# Patient Record
Sex: MALE | Race: WHITE | NOT HISPANIC OR LATINO | URBAN - METROPOLITAN AREA
[De-identification: names, ages, dates, MRNs, and addresses within clinical notes are randomized per-mention and may not be internally consistent; named-entity substitution may affect disease eponyms.]

---

## 2017-02-12 ENCOUNTER — EMERGENCY (EMERGENCY)
Facility: HOSPITAL | Age: 22
LOS: 1 days | Discharge: HOME | End: 2017-02-12
Attending: EMERGENCY MEDICINE | Admitting: EMERGENCY MEDICINE
Payer: MEDICAID

## 2017-02-12 VITALS
RESPIRATION RATE: 17 BRPM | SYSTOLIC BLOOD PRESSURE: 112 MMHG | TEMPERATURE: 98 F | OXYGEN SATURATION: 100 % | HEART RATE: 81 BPM | DIASTOLIC BLOOD PRESSURE: 71 MMHG

## 2017-02-12 DIAGNOSIS — R51 HEADACHE: ICD-10-CM

## 2017-02-12 DIAGNOSIS — S02.609A FRACTURE OF MANDIBLE, UNSPECIFIED, INITIAL ENCOUNTER FOR CLOSED FRACTURE: Chronic | ICD-10-CM

## 2017-02-12 PROCEDURE — 99284 EMERGENCY DEPT VISIT MOD MDM: CPT | Mod: 25

## 2017-02-12 PROCEDURE — 21310: CPT

## 2017-02-12 PROCEDURE — 70486 CT MAXILLOFACIAL W/O DYE: CPT | Mod: 26

## 2017-02-12 PROCEDURE — 70486 CT MAXILLOFACIAL W/O DYE: CPT

## 2017-02-12 RX ORDER — IBUPROFEN 200 MG
600 TABLET ORAL ONCE
Qty: 0 | Refills: 0 | Status: COMPLETED | OUTPATIENT
Start: 2017-02-12 | End: 2017-02-12

## 2017-02-12 RX ADMIN — Medication 1 TABLET(S): at 17:18

## 2017-02-12 RX ADMIN — Medication 600 MILLIGRAM(S): at 16:07
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
## 2017-02-12 NOTE — ED PROVIDER NOTE - ENMT, MLM
nose swollen no septal hematoma, eechymoses along right nasal bridge and right inferior orbit. nose swollen no septal hematoma, eechymoses along right nasal bridge and right inferior orbit, tender at inferior orbit, healing abrasion over nasal bridge

## 2017-02-12 NOTE — ED PROVIDER NOTE - CARE PLAN
Principal Discharge DX:	Concussion Principal Discharge DX:	Nasal fracture  Secondary Diagnosis:	Orbital fracture
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
## 2017-02-14 ENCOUNTER — EMERGENCY (EMERGENCY)
Facility: HOSPITAL | Age: 22
LOS: 1 days | Discharge: LEFT BEFORE DISCHARGE | End: 2017-02-14
Attending: EMERGENCY MEDICINE | Admitting: EMERGENCY MEDICINE
Payer: MEDICAID

## 2017-02-14 VITALS
SYSTOLIC BLOOD PRESSURE: 124 MMHG | HEART RATE: 78 BPM | RESPIRATION RATE: 16 BRPM | DIASTOLIC BLOOD PRESSURE: 74 MMHG | TEMPERATURE: 98 F | OXYGEN SATURATION: 100 %

## 2017-02-14 DIAGNOSIS — S02.609A FRACTURE OF MANDIBLE, UNSPECIFIED, INITIAL ENCOUNTER FOR CLOSED FRACTURE: Chronic | ICD-10-CM

## 2017-02-14 PROCEDURE — 99283 EMERGENCY DEPT VISIT LOW MDM: CPT

## 2017-02-14 NOTE — ED PROVIDER NOTE - PHYSICAL EXAMINATION
rt inf orbital/nasal tender, ecchymoses, no crepitus, eomi perrla, limited jaw opening secondary to pain, no malocclusion, no tmj tender, no loose teeth.   ncat  neck supple from strength 5/5  nml gait
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
## 2017-02-14 NOTE — ED PROVIDER NOTE - ATTENDING CONTRIBUTION TO CARE
DR. HATHAWAY, UPFRONT ATTENDING MD-  I was the attending upfront in Triage today and I performed the initial face to face bedside interview with patient regarding history of present illness, review of symptoms and past medical history. I completed an independent physical exam.  Since I was the inital provider who evalauted this patient, the history, ROS and examination was documented by me in the note.  I have signed out the follow up of any pending tests (ie. labs and/or radiological studies) to the PA.  I have discussed patient's plan of care and disposition with PA.  The scribe's documentation has been prepared under my direction and personally reviewed by me in its entirety. I confirm that the note above accurately reflects all work, treatment, procedures, and medical decision making performed by Rico Hathaway MD DR. LOPEZ, UPFRONT ATTENDING MD-  I was the attending upfront in Triage today and I performed the initial face to face bedside interview with patient regarding history of present illness, review of symptoms and past medical history. I completed an independent physical exam.  Since I was the inital provider who evalauted this patient, the history, ROS and examination was documented by me in the note.  I have signed out the follow up of any pending tests (ie. labs and/or radiological studies) to the PA.  I have discussed patient's plan of care and disposition with PA.

## 2017-03-08 ENCOUNTER — EMERGENCY (EMERGENCY)
Facility: HOSPITAL | Age: 22
LOS: 1 days | Discharge: HOME | End: 2017-03-08
Attending: EMERGENCY MEDICINE | Admitting: EMERGENCY MEDICINE
Payer: MEDICAID

## 2017-03-08 VITALS
DIASTOLIC BLOOD PRESSURE: 74 MMHG | SYSTOLIC BLOOD PRESSURE: 104 MMHG | HEART RATE: 88 BPM | RESPIRATION RATE: 16 BRPM | OXYGEN SATURATION: 98 %

## 2017-03-08 VITALS
RESPIRATION RATE: 18 BRPM | DIASTOLIC BLOOD PRESSURE: 92 MMHG | SYSTOLIC BLOOD PRESSURE: 146 MMHG | TEMPERATURE: 98 F | OXYGEN SATURATION: 96 % | HEART RATE: 86 BPM

## 2017-03-08 DIAGNOSIS — S02.609A FRACTURE OF MANDIBLE, UNSPECIFIED, INITIAL ENCOUNTER FOR CLOSED FRACTURE: Chronic | ICD-10-CM

## 2017-03-08 PROCEDURE — 99283 EMERGENCY DEPT VISIT LOW MDM: CPT | Mod: 25

## 2017-03-08 PROCEDURE — 71020: CPT | Mod: 26

## 2017-03-08 PROCEDURE — 99284 EMERGENCY DEPT VISIT MOD MDM: CPT | Mod: 25

## 2017-03-08 PROCEDURE — 71046 X-RAY EXAM CHEST 2 VIEWS: CPT

## 2017-03-08 RX ORDER — IBUPROFEN 200 MG
600 TABLET ORAL ONCE
Qty: 0 | Refills: 0 | Status: COMPLETED | OUTPATIENT
Start: 2017-03-08 | End: 2017-03-08

## 2017-03-08 RX ORDER — IBUPROFEN 200 MG
20 TABLET ORAL
Qty: 400 | Refills: 0 | OUTPATIENT
Start: 2017-03-08 | End: 2017-03-13

## 2017-03-08 RX ADMIN — Medication 600 MILLIGRAM(S): at 02:20

## 2017-03-08 NOTE — ED PROVIDER NOTE - NS ED ROS FT
Constitutional: No fever or chills  Eyes: No visual changes  HEENT: No throat pain  CV: No chest pain  Resp: No SOB no cough  GI: No abd pain, nausea or vomiting  : No dysuria  MSK: right rib pain  Skin: No rash  Neuro: No headache

## 2017-03-08 NOTE — ED PROVIDER NOTE - CARE PLAN
Principal Discharge DX:	Musculoskeletal pain  Instructions for follow-up, activity and diet:	1. return for worsening symptoms or anything concerning to you  2. take all home meds as prescribed  3. follow up with your pmd call to make an appointment  4. Take motrin 600mg PO Q6 hours prn pain

## 2017-03-08 NOTE — ED PROVIDER NOTE - PHYSICAL EXAMINATION
Constitutional: mild distress  Eyes: PERRLA EOMI  Head: Normocephalic atraumatic  Cardiac: regular rate   Resp: Lungs CTAB  GI: Abd s/nt/nd  Neuro: CN2-12 intact  Skin: No rashes  MSK: ttp of right 7th rib mid axillary line.

## 2017-03-08 NOTE — ED PROVIDER NOTE - MEDICAL DECISION MAKING DETAILS
21M presents with right rib pain after fall. clear lungs normal vital signs pain over 1 rib. Will obtain xray pain control and reassess
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
## 2017-03-08 NOTE — ED PROVIDER NOTE - PLAN OF CARE
1. return for worsening symptoms or anything concerning to you  2. take all home meds as prescribed  3. follow up with your pmd call to make an appointment  4. Take motrin 600mg PO Q6 hours prn pain

## 2017-03-09 ENCOUNTER — EMERGENCY (EMERGENCY)
Facility: HOSPITAL | Age: 22
LOS: 1 days | Discharge: HOME | End: 2017-03-09
Attending: EMERGENCY MEDICINE | Admitting: EMERGENCY MEDICINE
Payer: MEDICAID

## 2017-03-09 VITALS
DIASTOLIC BLOOD PRESSURE: 70 MMHG | TEMPERATURE: 98 F | RESPIRATION RATE: 16 BRPM | SYSTOLIC BLOOD PRESSURE: 117 MMHG | OXYGEN SATURATION: 98 % | HEART RATE: 82 BPM

## 2017-03-09 DIAGNOSIS — S02.609A FRACTURE OF MANDIBLE, UNSPECIFIED, INITIAL ENCOUNTER FOR CLOSED FRACTURE: Chronic | ICD-10-CM

## 2017-03-09 DIAGNOSIS — R07.81 PLEURODYNIA: ICD-10-CM

## 2017-03-09 PROCEDURE — 99282 EMERGENCY DEPT VISIT SF MDM: CPT | Mod: 25

## 2017-03-09 PROCEDURE — 99282 EMERGENCY DEPT VISIT SF MDM: CPT

## 2017-03-09 NOTE — ED PROVIDER NOTE - MEDICAL DECISION MAKING DETAILS
22yo male with pmh of depression and anxiety presents with right rib pain - declining pain meds - reassurance

## 2017-03-09 NOTE — ED PROVIDER NOTE - ATTENDING CONTRIBUTION TO CARE
right sided pain with movement, requesting not to remove clothing for exam, limited exam, no crepitus, no bony deformity on palpation, has capacity to make decision, he is requesting me not to contact family Oscar Larson MD (attending)

## 2017-03-09 NOTE — ED ADULT TRIAGE NOTE - CHIEF COMPLAINT QUOTE
right sided "rib pain" was seen here yesterday had cxr " I want a ct scan because an xray wont show anything" difficulty taking deep breath no active sob/dyspnea

## 2017-03-11 DIAGNOSIS — R07.81 PLEURODYNIA: ICD-10-CM
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
## 2017-03-11 DIAGNOSIS — F17.200 NICOTINE DEPENDENCE, UNSPECIFIED, UNCOMPLICATED: ICD-10-CM
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
## 2017-06-16 ENCOUNTER — EMERGENCY (EMERGENCY)
Facility: HOSPITAL | Age: 22
LOS: 1 days | Discharge: HOME | End: 2017-06-16
Admitting: EMERGENCY MEDICINE
Payer: COMMERCIAL

## 2017-06-16 VITALS
TEMPERATURE: 99 F | OXYGEN SATURATION: 100 % | DIASTOLIC BLOOD PRESSURE: 69 MMHG | SYSTOLIC BLOOD PRESSURE: 125 MMHG | RESPIRATION RATE: 18 BRPM | HEART RATE: 82 BPM

## 2017-06-16 DIAGNOSIS — F43.23 ADJUSTMENT DISORDER WITH MIXED ANXIETY AND DEPRESSED MOOD: ICD-10-CM

## 2017-06-16 DIAGNOSIS — S02.609A FRACTURE OF MANDIBLE, UNSPECIFIED, INITIAL ENCOUNTER FOR CLOSED FRACTURE: Chronic | ICD-10-CM

## 2017-06-16 LAB
ALBUMIN SERPL ELPH-MCNC: 4.5 G/DL — SIGNIFICANT CHANGE UP (ref 3.3–5)
ALP SERPL-CCNC: 71 U/L — SIGNIFICANT CHANGE UP (ref 40–120)
ALT FLD-CCNC: 18 U/L — SIGNIFICANT CHANGE UP (ref 4–41)
APAP SERPL-MCNC: < 15 UG/ML — LOW (ref 15–25)
AST SERPL-CCNC: 20 U/L — SIGNIFICANT CHANGE UP (ref 4–40)
BARBITURATES MEASUREMENT: NEGATIVE — SIGNIFICANT CHANGE UP
BASOPHILS # BLD AUTO: 0.04 K/UL — SIGNIFICANT CHANGE UP (ref 0–0.2)
BASOPHILS NFR BLD AUTO: 0.6 % — SIGNIFICANT CHANGE UP (ref 0–2)
BENZODIAZ SERPL-MCNC: NEGATIVE — SIGNIFICANT CHANGE UP
BILIRUB SERPL-MCNC: 0.4 MG/DL — SIGNIFICANT CHANGE UP (ref 0.2–1.2)
BUN SERPL-MCNC: 13 MG/DL — SIGNIFICANT CHANGE UP (ref 7–23)
CALCIUM SERPL-MCNC: 9.2 MG/DL — SIGNIFICANT CHANGE UP (ref 8.4–10.5)
CHLORIDE SERPL-SCNC: 104 MMOL/L — SIGNIFICANT CHANGE UP (ref 98–107)
CO2 SERPL-SCNC: 25 MMOL/L — SIGNIFICANT CHANGE UP (ref 22–31)
CREAT SERPL-MCNC: 0.67 MG/DL — SIGNIFICANT CHANGE UP (ref 0.5–1.3)
EOSINOPHIL # BLD AUTO: 0.16 K/UL — SIGNIFICANT CHANGE UP (ref 0–0.5)
EOSINOPHIL NFR BLD AUTO: 2.3 % — SIGNIFICANT CHANGE UP (ref 0–6)
ETHANOL BLD-MCNC: < 10 MG/DL — SIGNIFICANT CHANGE UP
GLUCOSE SERPL-MCNC: 89 MG/DL — SIGNIFICANT CHANGE UP (ref 70–99)
HCT VFR BLD CALC: 44.2 % — SIGNIFICANT CHANGE UP (ref 39–50)
HGB BLD-MCNC: 14.9 G/DL — SIGNIFICANT CHANGE UP (ref 13–17)
IMM GRANULOCYTES NFR BLD AUTO: 0.3 % — SIGNIFICANT CHANGE UP (ref 0–1.5)
LYMPHOCYTES # BLD AUTO: 2.59 K/UL — SIGNIFICANT CHANGE UP (ref 1–3.3)
LYMPHOCYTES # BLD AUTO: 36.9 % — SIGNIFICANT CHANGE UP (ref 13–44)
MCHC RBC-ENTMCNC: 31 PG — SIGNIFICANT CHANGE UP (ref 27–34)
MCHC RBC-ENTMCNC: 33.7 % — SIGNIFICANT CHANGE UP (ref 32–36)
MCV RBC AUTO: 92.1 FL — SIGNIFICANT CHANGE UP (ref 80–100)
MONOCYTES # BLD AUTO: 0.59 K/UL — SIGNIFICANT CHANGE UP (ref 0–0.9)
MONOCYTES NFR BLD AUTO: 8.4 % — SIGNIFICANT CHANGE UP (ref 2–14)
NEUTROPHILS # BLD AUTO: 3.61 K/UL — SIGNIFICANT CHANGE UP (ref 1.8–7.4)
NEUTROPHILS NFR BLD AUTO: 51.5 % — SIGNIFICANT CHANGE UP (ref 43–77)
PLATELET # BLD AUTO: 221 K/UL — SIGNIFICANT CHANGE UP (ref 150–400)
PMV BLD: 11.7 FL — SIGNIFICANT CHANGE UP (ref 7–13)
POTASSIUM SERPL-MCNC: 4.4 MMOL/L — SIGNIFICANT CHANGE UP (ref 3.5–5.3)
POTASSIUM SERPL-SCNC: 4.4 MMOL/L — SIGNIFICANT CHANGE UP (ref 3.5–5.3)
PROT SERPL-MCNC: 7.3 G/DL — SIGNIFICANT CHANGE UP (ref 6–8.3)
RBC # BLD: 4.8 M/UL — SIGNIFICANT CHANGE UP (ref 4.2–5.8)
RBC # FLD: 12.5 % — SIGNIFICANT CHANGE UP (ref 10.3–14.5)
SALICYLATES SERPL-MCNC: < 5 MG/DL — LOW (ref 15–30)
SODIUM SERPL-SCNC: 142 MMOL/L — SIGNIFICANT CHANGE UP (ref 135–145)
TSH SERPL-MCNC: 2.84 UIU/ML — SIGNIFICANT CHANGE UP (ref 0.27–4.2)
WBC # BLD: 7.01 K/UL — SIGNIFICANT CHANGE UP (ref 3.8–10.5)
WBC # FLD AUTO: 7.01 K/UL — SIGNIFICANT CHANGE UP (ref 3.8–10.5)

## 2017-06-16 PROCEDURE — 99285 EMERGENCY DEPT VISIT HI MDM: CPT | Mod: 25

## 2017-06-16 PROCEDURE — 90792 PSYCH DIAG EVAL W/MED SRVCS: CPT

## 2017-06-16 PROCEDURE — 93010 ELECTROCARDIOGRAM REPORT: CPT

## 2017-06-16 NOTE — ED ADULT TRIAGE NOTE - CHIEF COMPLAINT QUOTE
Patient arrives by EMS from [facility] c/o worsening anxiety with S/I and plan to jump off a rooftop, pt calm and cooperative with triage

## 2017-06-16 NOTE — ED BEHAVIORAL HEALTH ASSESSMENT NOTE - AXIS IV
Problems with interaction with legal system Educational problems/Occupational problems/Problems with interaction with legal system

## 2017-06-16 NOTE — ED BEHAVIORAL HEALTH ASSESSMENT NOTE - DIFFERENTIAL
deferred adjustment disorder; antisocial personality disorder; substance-induced mood disorder adjustment disorder; antisocial personality disorder; substance-induced mood disorder; major depression
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
## 2017-06-16 NOTE — ED BEHAVIORAL HEALTH ASSESSMENT NOTE - RISK ASSESSMENT
Risk factors include history of impulsivity, impaired insight & judgement, current suicidal ideation with plan and intent. This patient is at high risk for harm and requires inpatient level of care for safety and stabilization. Risk factors include history of impulsivity, limited insight, recent suicidal ideation, legal problems. This patient is at chronically elevated risk for harm, but at this time is denying suicidal or homicidal thoughts, wants to be discharged home so that he can watch TV, play video games, do his regular Friday evening prayers, does not wish to take psychiatric medication because he wants to live a long life and is concerned about damage to kidneys and liver.

## 2017-06-16 NOTE — ED BEHAVIORAL HEALTH ASSESSMENT NOTE - SUICIDE PROTECTIVE FACTORS
Supportive social network or family/Responsibility to family and others Responsibility to family and others/Supportive social network or family/Other

## 2017-06-16 NOTE — ED BEHAVIORAL HEALTH ASSESSMENT NOTE - PRIMARY DX
Major depressive disorder, recurrent severe without psychotic features Personality disorder Adjustment disorder with mixed anxiety and depressed mood

## 2017-06-16 NOTE — ED BEHAVIORAL HEALTH NOTE - BEHAVIORAL HEALTH NOTE
Writer was asked to assist the patient to get home.  was asked to assist the patient to get home by the evaluating psychiatrist. He requested to go by bus, but was not sure of the route or how many [employer] cards were needed.  printed a [employer] card trip planner and went over it with him, showing him that one [employer] card is needed, and that the trains are still running at this hour, which were his concerns.  provided him with [employer] card # [id number].
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
## 2017-06-16 NOTE — ED BEHAVIORAL HEALTH ASSESSMENT NOTE - DETAILS
see hpi report given to attending Dr Cox notified above wants to either jump off a building, or hang himself from a tree branch I spoke with Gabriel Gaines (therapist) at [facility], he will inform Dr. Astorga, and I will email them both as well. earlier today, stated that he wanted to either jump off a building, or hang himself from a tree branch

## 2017-06-16 NOTE — ED PROVIDER NOTE - OBJECTIVE STATEMENT
22 y/o M  hx Anxiety Disorder BIBA w c/o increase anxiety, depression and suicidal ideation to hang self secondary to pending court case. States " I don't want to see people, I don't want to talk" .  Denies punching or kicking any objects.  Denies pain, SOB, fever, chills, chest/abdominal discomfort. Denies HI/AH/VH. Denies recent use of alcohol or illicit drugs.

## 2017-06-16 NOTE — ED BEHAVIORAL HEALTH ASSESSMENT NOTE - PATIENT SEEN BY
Attending Psychiatrist supervising NP/Trainee and meeting pt Attending Psychiatrist without NP/Trainee

## 2017-06-16 NOTE — ED BEHAVIORAL HEALTH ASSESSMENT NOTE - OTHER PAST PSYCHIATRIC HISTORY (INCLUDE DETAILS REGARDING ONSET, COURSE OF ILLNESS, INPATIENT/OUTPATIENT TREATMENT)
10/16 [facility] 10/16 [facility] [place] - admitted to suicidal thoughts in context of legal issues, did not attend group therapy, was discharged on zoloft 125mg daily, atarax 50mg q6h prn. 10/16 admitted to [facility] - had suicidal thoughts in context of legal issues, did not attend group therapy, was discharged on zoloft 125mg daily, atarax 50mg q6h prn.

## 2017-06-16 NOTE — ED PROVIDER NOTE - MEDICAL DECISION MAKING DETAILS
22 y/o M  hx Anxiety Disorder   No evidence of physical injuries, broken skin or deformities.   Medical evaluation performed. There is no clinical evidence of intoxication or any acute medical problem requiring immediate intervention. Patient is awaiting psychiatric consultation. Final disposition will be determined by psychiatrist. 20 y/o M  hx Anxiety Disorder   No evidence of physical injuries, broken skin or deformities. Labs, Urine Tox/UA, EKG   Medical evaluation performed. There is no clinical evidence of intoxication or any acute medical problem requiring immediate intervention. Patient is awaiting psychiatric consultation. Final disposition will be determined by psychiatrist. 22 y/o M  hx Anxiety Disorder .  No evidence of physical injuries, broken skin or deformities. Labs, Urine Tox/UA, EKG   Medical evaluation performed. There is no clinical evidence of intoxication or any acute medical problem requiring immediate intervention. Patient is awaiting psychiatric consultation. Final disposition will be determined by psychiatrist.

## 2017-06-16 NOTE — ED BEHAVIORAL HEALTH ASSESSMENT NOTE - SUMMARY
Patient is a 21 year old single male; domiciled with parents and sister; non caregiver; full time student at [employer]; history of Anxiety Disorder, hx of Cannabis Abuse in past, no prior psychiatric admissions, no suicide attempts, sees Dr. Astorga in the outpatient [facility] clinic, no violence hx, currently on probation, pending potential senior care time after a probation violation, no ETOH use/DTs, no current substance use per patient, PMH denied, BIB EMS after psychiatrist called as patient was depressed and suicidal with a plan to shoot himself with a gun or to jump off of a building.     In ED, patient is agitated, actively suicidal, not able to state he will not kill himself on the inpatient unit, and in poor emotional control despite receiving medication. This patient is at high risk for harm and will require inpatient psychiatric admission & will require a 1:1 for active SI. Patient is a 21 year old single male; domiciled with parents and sister; non caregiver; full time student at [employer]; history of Anxiety Disorder, hx of Cannabis Abuse in past, no prior psychiatric admissions, no suicide attempts, sees Dr. Astorga in the outpatient [facility] clinic, no violence hx, currently on probation, pending potential assisted time after a probation violation, no ETOH use/DTs, no current substance use per patient, PMH denied, BIB EMS after psychiatrist called as patient was depressed and suicidal with a plan to shoot himself with a gun or to jump off of a building.     In ED, initially stated that he was hopeless and wanted to die, then later stated that he wanted to go home, play video games, watch TV, and planned on doing his regular Friday prayers this evening.  Stated that he was not interested in taking psychiatric medications because he wants to live a long life and is concerned about long-term effects on his kidney and liver. Patient is a 21 year old single male, domiciled with parents and sister; recent student at [employer], history of Anxiety Disorder, hx of Cannabis Abuse in past, 1 psychiatric admission in 2016 at [facility], sees Dr. Astorga in the outpatient [facility], no violence hx, currently on probation, pending potential [facility] time after a probation violation, court hearing next Wednesday, no ETOH use/DTs, no current substance use per patient, PMH of mandibular fracture (jaw wired shut last year), BIB EMS after psychiatrist called as patient was depressed and suicidal with a plan to jump off of building.     Collateral sources of info were contacted (father, mother, therapist at [facility]) - see above.      In ED, initially stated that he was hopeless and wanted to die, then later stated that he wanted to go home, play video games, watch TV, and planned on doing his regular Friday prayers this evening.  Stated that he was not interested in taking psychiatric medications because he wants to live a long life and is concerned about long-term effects on his kidney and liver.  Adamently denies suicidal thoughts, states he is hopeful, wants to work with his doctor and therapist to "get better" and "find out what's wrong with me" so "I can live a long life".

## 2017-06-16 NOTE — ED BEHAVIORAL HEALTH ASSESSMENT NOTE - CASE SUMMARY
20 yo M hx as above presents with active suicidal ideation to kill self, plan to jump off building or hang self in the context of legal issues related to sexual offenses.  He is extremely anxious, depressed, and hopeless.  Requires acute inpatient psychiatric hospitalization for safety and stabilization.

## 2017-06-16 NOTE — ED BEHAVIORAL HEALTH ASSESSMENT NOTE - BEHAVIOR
[facility] Hospitalist Progress Note     PCP: Annie Rose MD    SUBJECTIVE:  Pt had nausea, which improved since earlier today after fentanyl was dc'd and she received nausea med. Still sob but at baseline. No chest pain.     OBJECTIVE:  Temp:  [97 °F (36 docusate sodium  100 mg Oral BID     • bupivacaine 0.1% in  ml epidural     • sodium chloride 75 mL/hr at 03/08/17 2000   • lactated ringers     • Nitroglycerin in D5W Stopped (03/09/17 0300)     ondansetron HCl, Naloxone HCl, DiphenhydrAMINE HCl, Na Cooperative

## 2018-04-16 ENCOUNTER — APPOINTMENT (OUTPATIENT)
Dept: DERMATOLOGY | Facility: CLINIC | Age: 23
End: 2018-04-16
Payer: COMMERCIAL

## 2018-04-16 VITALS
BODY MASS INDEX: 30.06 KG/M2 | HEIGHT: 70 IN | DIASTOLIC BLOOD PRESSURE: 62 MMHG | SYSTOLIC BLOOD PRESSURE: 112 MMHG | WEIGHT: 210 LBS

## 2018-04-16 PROBLEM — Z00.00 ENCOUNTER FOR PREVENTIVE HEALTH EXAMINATION: Noted: 2018-04-16

## 2018-04-16 PROCEDURE — 99202 OFFICE O/P NEW SF 15 MIN: CPT

## 2018-04-16 RX ORDER — SERTRALINE 25 MG/1
0 TABLET, FILM COATED ORAL
Qty: 0 | Refills: 0 | COMMUNITY

## 2018-04-16 RX ORDER — BENZOYL PEROXIDE 5 G/100G
5 LIQUID TOPICAL
Qty: 1 | Refills: 5 | Status: ACTIVE | COMMUNITY
Start: 2018-04-16 | End: 1900-01-01

## 2018-04-16 RX ORDER — TRETINOIN 0.25 MG/G
0.03 CREAM TOPICAL
Qty: 1 | Refills: 3 | Status: ACTIVE | COMMUNITY
Start: 2018-04-16 | End: 1900-01-01

## 2018-04-18 ENCOUNTER — OTHER (OUTPATIENT)
Age: 23
End: 2018-04-18

## 2018-04-20 ENCOUNTER — OUTPATIENT (OUTPATIENT)
Dept: OUTPATIENT SERVICES | Facility: HOSPITAL | Age: 23
LOS: 1 days | End: 2018-04-20
Payer: COMMERCIAL

## 2018-04-20 ENCOUNTER — APPOINTMENT (OUTPATIENT)
Dept: INTERNAL MEDICINE | Facility: CLINIC | Age: 23
End: 2018-04-20
Payer: COMMERCIAL

## 2018-04-20 ENCOUNTER — OUTPATIENT (OUTPATIENT)
Dept: OUTPATIENT SERVICES | Facility: HOSPITAL | Age: 23
LOS: 1 days | Discharge: HOME | End: 2018-04-20

## 2018-04-20 VITALS
DIASTOLIC BLOOD PRESSURE: 80 MMHG | WEIGHT: 208 LBS | BODY MASS INDEX: 29.78 KG/M2 | HEIGHT: 70 IN | SYSTOLIC BLOOD PRESSURE: 120 MMHG

## 2018-04-20 DIAGNOSIS — Z78.9 OTHER SPECIFIED HEALTH STATUS: ICD-10-CM

## 2018-04-20 DIAGNOSIS — L70.0 ACNE VULGARIS: ICD-10-CM

## 2018-04-20 DIAGNOSIS — F17.200 NICOTINE DEPENDENCE, UNSPECIFIED, UNCOMPLICATED: ICD-10-CM

## 2018-04-20 DIAGNOSIS — S02.609A FRACTURE OF MANDIBLE, UNSPECIFIED, INITIAL ENCOUNTER FOR CLOSED FRACTURE: Chronic | ICD-10-CM

## 2018-04-20 DIAGNOSIS — I10 ESSENTIAL (PRIMARY) HYPERTENSION: ICD-10-CM

## 2018-04-20 DIAGNOSIS — F31.9 BIPOLAR DISORDER, UNSPECIFIED: ICD-10-CM

## 2018-04-20 DIAGNOSIS — Z00.00 ENCOUNTER FOR GENERAL ADULT MEDICAL EXAMINATION W/OUT ABNORMAL FINDINGS: ICD-10-CM

## 2018-04-20 DIAGNOSIS — Z82.0 FAMILY HISTORY OF EPILEPSY AND OTHER DISEASES OF THE NERVOUS SYSTEM: ICD-10-CM

## 2018-04-20 PROCEDURE — G0463: CPT

## 2018-04-20 PROCEDURE — 99204 OFFICE O/P NEW MOD 45 MIN: CPT | Mod: GC

## 2018-04-23 DIAGNOSIS — F19.90 OTHER PSYCHOACTIVE SUBSTANCE USE, UNSPECIFIED, UNCOMPLICATED: ICD-10-CM

## 2018-04-23 DIAGNOSIS — F31.9 BIPOLAR DISORDER, UNSPECIFIED: ICD-10-CM

## 2018-04-25 DIAGNOSIS — L70.0 ACNE VULGARIS: ICD-10-CM

## 2018-04-25 DIAGNOSIS — F31.9 BIPOLAR DISORDER, UNSPECIFIED: ICD-10-CM

## 2018-04-25 DIAGNOSIS — Z78.9 OTHER SPECIFIED HEALTH STATUS: ICD-10-CM

## 2018-04-25 DIAGNOSIS — Z82.0 FAMILY HISTORY OF EPILEPSY AND OTHER DISEASES OF THE NERVOUS SYSTEM: ICD-10-CM

## 2018-04-25 DIAGNOSIS — F17.200 NICOTINE DEPENDENCE, UNSPECIFIED, UNCOMPLICATED: ICD-10-CM

## 2018-05-24 LAB
ALBUMIN SERPL ELPH-MCNC: 4.7 G/DL
ALP BLD-CCNC: 91 U/L
ALT SERPL-CCNC: 27 U/L
ANION GAP SERPL CALC-SCNC: 12 MMOL/L
AST SERPL-CCNC: 18 U/L
BASOPHILS # BLD AUTO: 0.03 K/UL
BASOPHILS NFR BLD AUTO: 0.4 %
BILIRUB SERPL-MCNC: 0.6 MG/DL
BUN SERPL-MCNC: 11 MG/DL
CALCIUM SERPL-MCNC: 9.6 MG/DL
CHLORIDE SERPL-SCNC: 104 MMOL/L
CHOLEST SERPL-MCNC: 203 MG/DL
CHOLEST/HDLC SERPL: 3.9 RATIO
CO2 SERPL-SCNC: 26 MMOL/L
CREAT SERPL-MCNC: 0.87 MG/DL
EOSINOPHIL # BLD AUTO: 0.16 K/UL
EOSINOPHIL NFR BLD AUTO: 1.9 %
GLUCOSE SERPL-MCNC: 86 MG/DL
HBA1C MFR BLD HPLC: 5.5 %
HCT VFR BLD CALC: 46.6 %
HDLC SERPL-MCNC: 52 MG/DL
HGB BLD-MCNC: 15.9 G/DL
HIV1+2 AB SPEC QL IA.RAPID: NONREACTIVE
IMM GRANULOCYTES NFR BLD AUTO: 0.1 %
LDLC SERPL CALC-MCNC: 136 MG/DL
LYMPHOCYTES # BLD AUTO: 1.85 K/UL
LYMPHOCYTES NFR BLD AUTO: 22.1 %
MAN DIFF?: NORMAL
MCHC RBC-ENTMCNC: 30.3 PG
MCHC RBC-ENTMCNC: 34.1 GM/DL
MCV RBC AUTO: 88.8 FL
MONOCYTES # BLD AUTO: 0.6 K/UL
MONOCYTES NFR BLD AUTO: 7.2 %
NEUTROPHILS # BLD AUTO: 5.74 K/UL
NEUTROPHILS NFR BLD AUTO: 68.3 %
PLATELET # BLD AUTO: 267 K/UL
POTASSIUM SERPL-SCNC: 4.6 MMOL/L
PROT SERPL-MCNC: 8 G/DL
RBC # BLD: 5.25 M/UL
RBC # FLD: 13.4 %
SODIUM SERPL-SCNC: 142 MMOL/L
TRIGL SERPL-MCNC: 77 MG/DL
TSH SERPL-ACNC: 1.44 UIU/ML
WBC # FLD AUTO: 8.39 K/UL

## 2018-08-08 NOTE — ED PROVIDER NOTE - OBJECTIVE STATEMENT
22yo male with pmh of depression and anxiety presents with right rib pain. Pt fell down stairs 5 days ago. Since then has been having progressive pain in the right ribs. hasn't taken anything for it. Has been acting normally. No vomiting no nausea. no headache. no change in strength or sensation. pain moderate constant non-radiating. Pt was here yesterday for same today wants to make sure nothing is broken. Pt denies difficulty breathing. Denies SI/HI/AH/VH pt

## 2018-11-28 ENCOUNTER — RX RENEWAL (OUTPATIENT)
Age: 23
End: 2018-11-28

## 2018-11-28 RX ORDER — TRETINOIN 0.25 MG/G
0.03 CREAM TOPICAL
Qty: 45 | Refills: 3 | Status: ACTIVE | COMMUNITY
Start: 2018-07-06 | End: 1900-01-01

## 2019-03-06 ENCOUNTER — OUTPATIENT (OUTPATIENT)
Dept: OUTPATIENT SERVICES | Facility: HOSPITAL | Age: 24
LOS: 1 days | Discharge: STILL ADMITTED OR ADMINISTRATIVE | End: 2019-03-06
Payer: MEDICAID

## 2019-03-06 DIAGNOSIS — S02.609A FRACTURE OF MANDIBLE, UNSPECIFIED, INITIAL ENCOUNTER FOR CLOSED FRACTURE: Chronic | ICD-10-CM

## 2019-03-07 DIAGNOSIS — F12.20 CANNABIS DEPENDENCE, UNCOMPLICATED: ICD-10-CM

## 2019-03-07 DIAGNOSIS — F39 UNSPECIFIED MOOD [AFFECTIVE] DISORDER: ICD-10-CM

## 2019-03-07 DIAGNOSIS — F19.94 OTHER PSYCHOACTIVE SUBSTANCE USE, UNSPECIFIED WITH PSYCHOACTIVE SUBSTANCE-INDUCED MOOD DISORDER: ICD-10-CM

## 2019-03-07 PROBLEM — F41.9 ANXIETY DISORDER, UNSPECIFIED: Chronic | Status: ACTIVE | Noted: 2017-02-12

## 2019-03-07 PROBLEM — F32.9 MAJOR DEPRESSIVE DISORDER, SINGLE EPISODE, UNSPECIFIED: Chronic | Status: ACTIVE | Noted: 2017-02-12

## 2019-03-07 PROCEDURE — 90792 PSYCH DIAG EVAL W/MED SRVCS: CPT

## 2019-10-01 NOTE — ED ADULT TRIAGE NOTE - ESI TRIAGE ACUITY LEVEL, MLM
4 Itraconazole Pregnancy And Lactation Text: This medication is Pregnancy Category C and it isn't know if it is safe during pregnancy. It is also excreted in breast milk.

## 2021-06-11 ENCOUNTER — TRANSCRIPTION ENCOUNTER (OUTPATIENT)
Age: 26
End: 2021-06-11

## 2021-11-17 NOTE — ED PROVIDER NOTE - DISPOSITION TYPE
Detail Level: Detailed Detail Level: Simple Payment Option: Option 1: Bill Medicare, await for decision on payment. Cost Of Treatment Patient Responsible For Paying?: 200 Reason?: Additional Information Procedure (Limit To 20 Characters): LN Reason?: Possible non-covered service Detail Level: Detailed DISCHARGE

## 2022-09-05 NOTE — ED PROVIDER NOTE - PSYCHIATRIC MOOD
normal... Well appearing, awake, alert, oriented to person, place, time/situation and in no apparent distress. AGITATED/ANXIOUS

## 2023-01-02 NOTE — ED BEHAVIORAL HEALTH ASSESSMENT NOTE - MEDICATIONS (PRESCRIPTIONS, DIRECTIONS)
Refill for clonazepam 0.5mg requested. MD please sign T'd rx below for 30 day supply, no refills. 6/2023 follow-up scheduled.     Alberto Gorman, RN, BSN  [facility]     Zoloft 150mg daily, hydroxyzine pamoate 50mg [id number]

## 2023-09-15 ENCOUNTER — APPOINTMENT (OUTPATIENT)
Dept: CARDIOLOGY | Facility: CLINIC | Age: 28
End: 2023-09-15

## 2024-01-25 NOTE — ED PROVIDER NOTE - CPE EDP NEURO NORM
1/25/24 - CALLED AND SPOKE TO PT'S MOTHER WHO STATED BILL IS SICK BUT SHE WANTED TO SCHEDULE HIS STUDIES PRIOR TO HIS 2/28/24 APT. BOTH MRA HEAD AND MRI BRAIN SCHEDULED FOR 2/13/24 2/28/24 6 [facility] F/U W/MRA HEAD AND MRI BRAIN W/MARIA A   normal...

## 2024-05-18 ENCOUNTER — EMERGENCY (EMERGENCY)
Facility: HOSPITAL | Age: 29
LOS: 1 days | Discharge: HOME | End: 2024-05-18
Attending: EMERGENCY MEDICINE
Payer: SELF-PAY

## 2024-05-18 VITALS
DIASTOLIC BLOOD PRESSURE: 67 MMHG | OXYGEN SATURATION: 96 % | RESPIRATION RATE: 18 BRPM | SYSTOLIC BLOOD PRESSURE: 108 MMHG | HEART RATE: 80 BPM | TEMPERATURE: 98 F

## 2024-05-18 VITALS
OXYGEN SATURATION: 95 % | HEART RATE: 95 BPM | HEIGHT: 71 IN | TEMPERATURE: 98 F | SYSTOLIC BLOOD PRESSURE: 121 MMHG | DIASTOLIC BLOOD PRESSURE: 70 MMHG | RESPIRATION RATE: 18 BRPM | WEIGHT: 179.9 LBS

## 2024-05-18 DIAGNOSIS — S02.609A FRACTURE OF MANDIBLE, UNSPECIFIED, INITIAL ENCOUNTER FOR CLOSED FRACTURE: Chronic | ICD-10-CM

## 2024-05-18 PROCEDURE — 70450 CT HEAD/BRAIN W/O DYE: CPT | Mod: 26,MC

## 2024-05-18 PROCEDURE — 99285 EMERGENCY DEPT VISIT HI MDM: CPT

## 2024-05-18 PROCEDURE — 70450 CT HEAD/BRAIN W/O DYE: CPT | Mod: MC

## 2024-05-18 PROCEDURE — 93005 ELECTROCARDIOGRAM TRACING: CPT

## 2024-05-18 PROCEDURE — 72125 CT NECK SPINE W/O DYE: CPT | Mod: MC

## 2024-05-18 PROCEDURE — 72125 CT NECK SPINE W/O DYE: CPT | Mod: 26,MC

## 2024-05-18 PROCEDURE — 99285 EMERGENCY DEPT VISIT HI MDM: CPT | Mod: 25

## 2024-05-18 PROCEDURE — 99053 MED SERV 10PM-8AM 24 HR FAC: CPT

## 2024-05-18 NOTE — ED PROVIDER NOTE - OBJECTIVE STATEMENT
28 male presenting to the ED  after his friends called EMS after patient fell while intoxicated.  Patient bedside giving limited history answering that he fell and hit his head but will not describe the mechanism.   Patient   Was drinking scotch.  Denying any  or drug use, no SI, no HI, no AH.  Patient notes the back of his head hurts but is otherwise well and would like to sleep. 28 male presenting to the ED  after his friends called EMS after patient fell while intoxicated.  Patient bedside giving limited history answering that he fell and hit his head but will not describe the mechanism.   Patient states he Was drinking scotch.  Denying any  or drug use, no SI, no HI, no AH.  Patient notes the back of his head hurts but is otherwise well and would like to sleep.

## 2024-05-18 NOTE — ED ADULT NURSE NOTE - OBJECTIVE STATEMENT
27 y/o male presents to the ED BIBEMS s/p witnessed fall in parking lot. Alert and follow commands. PMH: Denies.  Safety and comfort provided. 27 y/o male presents to the ED BIBEMS s/p witnessed fall in parking lot. Alert and follow commands. PMH: Denies. As per EMS, friends were on scene who called EMS after his fall. Patient endorses, "I drank whiskey". Patient endorses that, "I fell and hit the back of my head". Patient denies to describe mechanism of fall. Patient states, "I want to sleep". Patient denies LOC. Patient noted to have vomit on suit jacket. Safety and comfort provided.

## 2024-05-18 NOTE — ED PROVIDER NOTE - NSFOLLOWUPINSTRUCTIONS_ED_ALL_ED_FT
It was a pleasure caring for you today.  You had a CAT scan   Of the head performed that showed no acute findings.  Please make sure to stay hydrated and get plenty of rest.    Please return to the hospital if you experience any new or worsening symptoms including blurred vision, trouble walking,  vomiting multiple times a day.

## 2024-05-18 NOTE — ED PROVIDER NOTE - ATTENDING CONTRIBUTION TO CARE
28M No PMH here by EMS for EtOH intox, fall w head strike, no LOC. No other complaints. Clinically intox, no external signs of traumatic injury, Will get CTH given unreliable hx and reports of fall. Will monitor pending sobriety.

## 2024-05-18 NOTE — ED PROVIDER NOTE - PHYSICAL EXAMINATION
Const:  patient appears clinically intoxicated  Eyes: no conjunctival injection, and symmetrical lids.  HEENT:  no palpable hematoma, no skull depressions.  No midline tenderness, no tenderness to palp patient head      Neck: Symmetric, trachea midline.   RESP: Unlabored respiratory effort.   GI: Nontender/Nondistended  MSK: no midline vertebral tenderness.  Moving all extremities. Normocephalic/Atraumatic, Lower Extremities w/o calf tenderness or edema b/l.   Skin: Warm, dry and intact.   Neuro: CNs II-XII grossly intact. Motor & Sensation grossly intact.

## 2024-05-18 NOTE — ED PROVIDER NOTE - CLINICAL SUMMARY MEDICAL DECISION MAKING FREE TEXT BOX
28 male presenting to the ED  after his friends called EMS after patient fell while intoxicated.  Patient bedside giving limited history answering that he fell and hit his head but will not describe the mechanism.   Patient   Was drinking scotch.  Denying any  or drug use, no SI, no HI, no AH.  Patient notes the back of his head hurts but is otherwise well and would like to sleep.     Patient notes he hit his head and points to it.  Will get CT ED imaging and place on end-tidal.

## 2024-05-18 NOTE — ED ADULT NURSE REASSESSMENT NOTE - NS ED NURSE REASSESS COMMENT FT1
[employer] [facility] [employer] contacted at [phone] to bring patient home. Patient unable to use phone due to observing fritz. Patient endorses, "I have the financial means to pay at home". Safety and comfort provided.
Patient a/ox4, breathing spontaneously and unlabored. Patient states, "I want to go home and I will take [facility] [employer]". As per NOEMI Darby, [facility] [employer] opens at 6am. RN to reassess at that time. Patient denies HA, nausea, vomiting, diarrhea, fever, cough and chills. Safety and comfort provided.
Patient tolerated PO and ambulated with a steady gait. Safety and comfort provided.
Patient a/ox3, unaware of year. MD Mendes made aware. No new rn interventions at this time. Patient denies nausea, vomiting, chest pain, dyspnea, lightheadedness and fatigue. Safety and comfort provided.

## 2024-05-18 NOTE — ED ADULT NURSE NOTE - NS TRANSFER PATIENT BELONGINGS
1 gold wrist watch, 1 gold necklace, 1 gold pinky ring with red, 1 gold pinky ring with blue, 1 gold wristband/Wrist Watch/Jewelry/Money (specify)

## 2024-05-18 NOTE — ED PROVIDER NOTE - PATIENT PORTAL LINK FT
You can access the FollowMyHealth Patient Portal offered by [facility] by registering at the following website: [email or web address]. By joining [facility]’s FollowMyHealth portal, you will also be able to view your health information using other applications (apps) compatible with our system.

## 2024-05-18 NOTE — ED ADULT NURSE NOTE - NSFALLRISKINTERV_ED_ALL_ED
Assistance OOB with selected safe patient handling equipment if applicable/Assistance with ambulation/Communicate fall risk and risk factors to all staff, patient, and family/Monitor gait and stability/Monitor for mental status changes and reorient to person, place, and time, as needed/Provide visual cue: yellow wristband, yellow gown, etc/Reinforce activity limits and safety measures with patient and family/Toileting schedule using arm’s reach rule for commode and bathroom/Use of alarms - bed, stretcher, chair and/or video monitoring/Call bell, personal items and telephone in reach/Instruct patient to call for assistance before getting out of bed/chair/stretcher/Non-slip footwear applied when patient is off stretcher/[place] to call system/Physically safe environment - no spills, clutter or unnecessary equipment/Purposeful Proactive Rounding/Room/bathroom lighting operational, light cord in reach

## 2024-05-18 NOTE — ED PROVIDER NOTE - PROGRESS NOTE DETAILS
Pt awake, no complaints. Will call taxi to get home. [place] [facility] opens at 0600. Karlo Rodriguez, PGY3 Patient states he is ready to go home.  Jumping up and down in the halls, took p.o. walking around.

## 2024-05-29 NOTE — ED PROVIDER NOTE - CPE EDP ENMT NORM
Patient Education    BRIGHT FUTURES HANDOUT- PATIENT  11 THROUGH 14 YEAR VISITS  Here are some suggestions from [employer] experts that may be of value to your family.     HOW YOU ARE DOING  Enjoy spending time with your family. Look for ways to help out at home.  Follow your family s rules.  Try to be responsible for your schoolwork.  If you need help getting organized, ask your parents or teachers.  Try to read every day.  Find activities you are really interested in, such as sports or theater.  Find activities that help others.  Figure out ways to deal with stress in ways that work for you.  Don t smoke, vape, use drugs, or drink alcohol. Talk with us if you are worried about alcohol or drug use in your family.  Always talk through problems and never use violence.  If you get angry with someone, try to walk away.    HEALTHY BEHAVIOR CHOICES  Find fun, safe things to do.  Talk with your parents about alcohol and drug use.  Say  No!  to drugs, alcohol, cigarettes and e-cigarettes, and sex. Saying  No!  is OK.  Don t share your prescription medicines; don t use other people s medicines.  Choose friends who support your decision not to use tobacco, alcohol, or drugs. Support friends who choose not to use.  Healthy dating relationships are built on respect, concern, and doing things both of you like to do.  Talk with your parents about relationships, sex, and values.  Talk with your parents or another adult you trust about puberty and sexual pressures. Have a plan for how you will handle risky situations.    YOUR GROWING AND CHANGING BODY  Brush your teeth twice a day and floss once a day.  Visit the dentist twice a year.  Wear a mouth guard when playing sports.  Be a healthy eater. It helps you do well in school and sports.  Have vegetables, fruits, lean protein, and whole grains at meals and snacks.  Limit fatty, sugary, salty foods that are low in nutrients, such as candy, chips, and ice cream.  Eat when you re  hungry. Stop when you feel satisfied.  Eat with your family often.  Eat breakfast.  Choose water instead of soda or sports drinks.  Aim for at least 1 hour of physical activity every day.  Get enough sleep.    YOUR FEELINGS  Be proud of yourself when you do something good.  It s OK to have up-and-down moods, but if you feel sad most of the time, let us know so we can help you.  It s important for you to have accurate information about sexuality, your physical development, and your sexual feelings toward the opposite or same sex. Ask us if you have any questions.    STAYING SAFE  Always wear your lap and shoulder seat belt.  Wear protective gear, including helmets, for playing sports, biking, skating, skiing, and skateboarding.  Always wear a life jacket when you do water sports.  Always use sunscreen and a hat when you re outside. Try not to be outside for too long between 11:00 am and 3:00 pm, when it s easy to get a sunburn.  Don t ride ATVs.  Don t ride in a car with someone who has used alcohol or drugs. Call your parents or another trusted adult if you are feeling unsafe.  Fighting and carrying weapons can be dangerous. Talk with your parents, teachers, or doctor about how to avoid these situations.        Consistent with Bright Futures: Guidelines for Health Supervision of Infants, Children, and Adolescents, 4th Edition  For more information, go to https://brightfutures.aap.org.             Patient Education    BRIGHT FUTURES HANDOUT- PARENT  11 THROUGH 14 YEAR VISITS  Here are some suggestions from Bright Futures experts that may be of value to your family.     HOW YOUR FAMILY IS DOING  Encourage your child to be part of family decisions. Give your child the chance to make more of her own decisions as she grows older.  Encourage your child to think through problems with your support.  Help your child find activities she is really interested in, besides schoolwork.  Help your child find and try activities that  help others.  Help your child deal with conflict.  Help your child figure out nonviolent ways to handle anger or fear.  If you are worried about your living or food situation, talk with us. Community agencies and programs such as SNAP can also provide information and assistance.    YOUR GROWING AND CHANGING CHILD  Help your child get to the dentist twice a year.  Give your child a fluoride supplement if the dentist recommends it.  Encourage your child to brush her teeth twice a day and floss once a day.  Praise your child when she does something well, not just when she looks good.  Support a healthy body weight and help your child be a healthy eater.  Provide healthy foods.  Eat together as a family.  Be a role model.  Help your child get enough calcium with low-fat or fat-free milk, low-fat yogurt, and cheese.  Encourage your child to get at least 1 hour of physical activity every day. Make sure she uses helmets and other safety gear.  Consider making a family media use plan. Make rules for media use and balance your child s time for physical activities and other activities.  Check in with your child s teacher about grades. Attend back-to-school events, parent-teacher conferences, and other school activities if possible.  Talk with your child as she takes over responsibility for schoolwork.  Help your child with organizing time, if she needs it.  Encourage daily reading.  YOUR CHILD S FEELINGS  Find ways to spend time with your child.  If you are concerned that your child is sad, depressed, nervous, irritable, hopeless, or angry, let us know.  Talk with your child about how his body is changing during puberty.  If you have questions about your child s sexual development, you can always talk with us.    HEALTHY BEHAVIOR CHOICES  Help your child find fun, safe things to do.  Make sure your child knows how you feel about alcohol and drug use.  Know your child s friends and their parents. Be aware of where your child  is and what he is doing at all times.  Lock your liquor in a cabinet.  Store prescription medications in a locked cabinet.  Talk with your child about relationships, sex, and values.  If you are uncomfortable talking about puberty or sexual pressures with your child, please ask us or others you trust for reliable information that can help.  Use clear and consistent rules and discipline with your child.  Be a role model.    SAFETY  Make sure everyone always wears a lap and shoulder seat belt in the car.  Provide a properly fitting helmet and safety gear for biking, skating, in-line skating, skiing, snowmobiling, and horseback riding.  Use a hat, sun protection clothing, and sunscreen with SPF of 15 or higher on her exposed skin. Limit time outside when the sun is strongest (11:00 am-3:00 pm).  Don t allow your child to ride ATVs.  Make sure your child knows how to get help if she feels unsafe.  If it is necessary to keep a gun in your home, store it unloaded and locked with the ammunition locked separately from the gun.          Helpful Resources:  Family Media Use Plan: www.healthychildren.org/MediaUsePlan   Consistent with Bright Futures: Guidelines for Health Supervision of Infants, Children, and Adolescents, 4th Edition  For more information, go to https://brightfutures.aap.org.              normal...